# Patient Record
Sex: FEMALE | Race: WHITE | NOT HISPANIC OR LATINO | URBAN - METROPOLITAN AREA
[De-identification: names, ages, dates, MRNs, and addresses within clinical notes are randomized per-mention and may not be internally consistent; named-entity substitution may affect disease eponyms.]

---

## 2019-04-16 ENCOUNTER — IMPORTED ENCOUNTER (OUTPATIENT)
Dept: URBAN - METROPOLITAN AREA CLINIC 1 | Facility: CLINIC | Age: 84
End: 2019-04-16

## 2019-04-16 PROBLEM — H17.822: Noted: 2019-04-16

## 2019-04-16 PROBLEM — Z79.84: Noted: 2019-04-16

## 2019-04-16 PROBLEM — Z96.1: Noted: 2019-04-16

## 2019-04-16 PROBLEM — H16.143: Noted: 2019-04-16

## 2019-04-16 PROBLEM — H40.013: Noted: 2019-04-16

## 2019-04-16 PROBLEM — H35.372: Noted: 2019-04-16

## 2019-04-16 PROBLEM — H04.123: Noted: 2019-04-16

## 2019-04-16 PROBLEM — E11.9: Noted: 2019-04-16

## 2019-04-16 PROCEDURE — 92004 COMPRE OPH EXAM NEW PT 1/>: CPT

## 2019-04-16 NOTE — PATIENT DISCUSSION
1.  DM Type II (Oral Medication) without sign of diabetic retinopathy and no blot heme on dilated retinal examination today OU No Macular Edema:  Discussed the pathophysiology of diabetes and its effect on the eye and risk of blindness. Stressed the importance of strong glucose control. Advised of importance of at least yearly dilated examinations but to contact us immediately for any problems or concerns. 2. SIDDHARTHA w/ PEK OU- Recommend ATs TID OU routinely 3. Pseudophakia OU - H/o YAG Cap OU 4. Glaucoma Suspect OU (CD 0.80 OU): Negative family hx. Patient is considered Low Risk. Condition was discussed with patient and patient understands. Will continue to monitor patient for any progression in condition. Patient was advised to call us with any problems questions or concerns. 5.  Epiretinal Membrane OS - Observe for change. 6. Peripheral Corneal Scar OS - Observe 7. Temporal Arteritis - Followed by Dr. Judy Hamilton currently on 40mg of Prednisone. F/u as scheduledReturn for an appointment in 6 months DFE/OCT with Dr. Jose Alberto Tafoya.

## 2019-06-11 ENCOUNTER — IMPORTED ENCOUNTER (OUTPATIENT)
Dept: URBAN - METROPOLITAN AREA CLINIC 1 | Facility: CLINIC | Age: 84
End: 2019-06-11

## 2019-06-11 PROBLEM — Z79.84: Noted: 2019-06-11

## 2019-06-11 PROBLEM — H35.372: Noted: 2019-06-11

## 2019-06-11 PROBLEM — E11.9: Noted: 2019-06-11

## 2019-06-11 PROBLEM — H04.123: Noted: 2019-06-11

## 2019-06-11 PROBLEM — M31.6: Noted: 2019-06-11

## 2019-06-11 PROBLEM — H16.143: Noted: 2019-06-11

## 2019-06-11 PROBLEM — Z96.1: Noted: 2019-06-11

## 2019-06-11 PROCEDURE — 99213 OFFICE O/P EST LOW 20 MIN: CPT

## 2019-06-11 NOTE — PATIENT DISCUSSION
1.  Temporal Arteritis - Slit Lamp exam does not show a measurable difference  with respect to the optic nerve appearance. However her vision has declined by two lines in both eyes. 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 Also to be considered in a differential however is the patient has Dry Eye Syndrome which may also be affecting her visual acuity to this level of decline. I do feel that it is reasonable to increase her Prednisone dose at least initially to see if her symptoms improve. We will also increase her Artificial Tear use to Q3H OU.2.  SIDDHARTHA w/ PEK OU- Increase ATs Q3H OU 3. Pseudophakia OU - H/o YAG Cap OU 4.  Epiretinal Membrane OS - Observe for change. 5. DM Type II (Oral Medication) without sign of diabetic retinopathy and no blot heme on dilated retinal examination today OU No Macular Edema:  Discussed the pathophysiology of diabetes and its effect on the eye and risk of blindness. Stressed the importance of strong glucose control. Advised of importance of at least yearly dilated examinations but to contact us immediately for any problems or concerns. 6. Glaucoma Suspect OU (CD 0.80 OU): Negative family hx. Patient is considered Low Risk. 7.  Peripheral Corneal Scar OS - Observe Return for an appointment in 1 month 10/MRX (check ks) with Dr. Wes Conley.

## 2019-06-11 NOTE — PATIENT DISCUSSION
DM Type II (Oral Medication) without sign of diabetic retinopathy and no blot heme on dilated retinal examination today OU No Macular Edema:  Discussed the pathophysiology of diabetes and its effect on the eye and risk of blindness. Stressed the importance of strong glucose control. Advised of importance of at least yearly dilated examinations but to contact us immediately for any problems or concerns.

## 2019-07-15 ENCOUNTER — IMPORTED ENCOUNTER (OUTPATIENT)
Dept: URBAN - METROPOLITAN AREA CLINIC 1 | Facility: CLINIC | Age: 84
End: 2019-07-15

## 2019-07-15 PROBLEM — H04.123: Noted: 2019-07-15

## 2019-07-15 PROBLEM — H16.143: Noted: 2019-07-15

## 2019-07-15 PROCEDURE — 92012 INTRM OPH EXAM EST PATIENT: CPT

## 2019-07-15 NOTE — PATIENT DISCUSSION
1.  Temporal Arteritis - VA stable from last visit pt currently weaned down to 30mg Pred PO Qday. 2.  SIDDHARTHA w/ PEK OU -- Improved. Recommend continue the frequent use of OTC Q3H OU Routinely. 3.  Pseudophakia OU - H/o YAG Cap OU 4. H/ Epiretinal Membrane OS - Observe for change. 5. H/o DM Type II (Oral Medication) w/o DR / DME OU 6. Glaucoma Suspect OU (CD 0.80 OU): Negative family hx. Patient is considered Low Risk. 7.  Peripheral Corneal Scar OS -- ObserveReturn for an appointment in 10 WK for a 10 / DFE / 24-2 HVF OU with Dr. Rudy Humphrey.

## 2019-08-29 ENCOUNTER — IMPORTED ENCOUNTER (OUTPATIENT)
Dept: URBAN - METROPOLITAN AREA CLINIC 1 | Facility: CLINIC | Age: 84
End: 2019-08-29

## 2019-08-29 PROBLEM — H40.1131: Noted: 2019-08-29

## 2019-08-29 PROCEDURE — 92012 INTRM OPH EXAM EST PATIENT: CPT

## 2019-08-29 PROCEDURE — 92083 EXTENDED VISUAL FIELD XM: CPT

## 2019-08-29 PROCEDURE — 92015 DETERMINE REFRACTIVE STATE: CPT

## 2019-08-29 NOTE — PATIENT DISCUSSION
1.  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 Based on Fm Hx (Father) and VF defects OU noted on today's VF testing will begin monotrial of Travatan Z QHS OS (Sample given). Will recheck patient in 3-4 weeks recheck IOP on monotrial OS. 2.  Temporal Arteritis - VA stable from last visit pt currently weaned down to 20mg of po Prednisone Qdaily. 3.  SIDDHARTHA w/ PEK OU -- Cont PF ATs Q3hrs OU w/a Routinely. 4.  Pseudophakia OU - H/o YAG Cap OU 5. Epiretinal Membrane OS - Stable Observe for change. 6. DM Type II (Oral Medication) w/o DR / DME OU - Stable Glucose control. 7.  Peripheral Corneal Scar OS -- ObserveReturn for an appointment in 3-4 weeks 10 (monotrial IOP recheck OS) with Dr. Karl Lima.

## 2019-09-23 ENCOUNTER — IMPORTED ENCOUNTER (OUTPATIENT)
Dept: URBAN - METROPOLITAN AREA CLINIC 1 | Facility: CLINIC | Age: 84
End: 2019-09-23

## 2019-09-23 PROBLEM — H40.1131: Noted: 2019-09-23

## 2019-09-23 PROCEDURE — 92012 INTRM OPH EXAM EST PATIENT: CPT

## 2019-09-23 NOTE — PATIENT DISCUSSION
1.  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 (Due to  use will rx Lumigan). Will now use Lumigan and use QHS OU (Rx sent to [facility]). Compliance urged. 2.  Temporal Arteritis - VA stable from last visit pt monitored by Dr. Sherrill Paz regarding po Prednisone dosage & systemic testing. F/u as scheduled with Dr. Sherrill Paz 3. SIDDHARTHA w/ PEK OU -- Cont PF ATs Q3hrs OU w/a Routinely. 4.  Pseudophakia OU - H/o YAG Cap OU 5. Epiretinal Membrane OS - Stable Observe for change. 6. DM Type II (Oral Medication) w/o DR / DME OU - Stable Glucose control. 7.  Peripheral Corneal Scar OS -- ObserveReturn for an appointment in 6 mo 30 OCT with Dr. Cathleen Warner.

## 2019-10-24 ENCOUNTER — IMPORTED ENCOUNTER (OUTPATIENT)
Dept: URBAN - METROPOLITAN AREA CLINIC 1 | Facility: CLINIC | Age: 84
End: 2019-10-24

## 2019-10-24 PROBLEM — Z79.84: Noted: 2019-10-24

## 2019-10-24 PROBLEM — Z96.1: Noted: 2019-10-24

## 2019-10-24 PROBLEM — H35.3121: Noted: 2019-10-24

## 2019-10-24 PROBLEM — H04.123: Noted: 2019-10-24

## 2019-10-24 PROBLEM — H35.372: Noted: 2019-10-24

## 2019-10-24 PROBLEM — H16.143: Noted: 2019-10-24

## 2019-10-24 PROBLEM — E11.9: Noted: 2019-10-24

## 2019-10-24 PROBLEM — H17.822: Noted: 2019-10-24

## 2019-10-24 PROCEDURE — 92133 CPTRZD OPH DX IMG PST SGM ON: CPT

## 2019-10-24 PROCEDURE — 92012 INTRM OPH EXAM EST PATIENT: CPT

## 2019-10-24 NOTE — PATIENT DISCUSSION
1.  ARMD OS Early/dry/stable. Importance of daily AREDS II study multivitamin and Amsler Grid checks discussed with patient. Patient to follow-up immediately with any new onset of decreased vision and/or metamorphopsia. 2. Epiretinal Membrane OS - Observe for change. 3. 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 IOP stable cont Lumigan QHS OU. Fm Hx (Father)DM Type II (Oral Medication) without sign of diabetic retinopathy and no blot heme on dilated retinal examination today OU No Macular Edema:  Discussed the pathophysiology of diabetes and its effect on the eye and risk of blindness. Stressed the importance of strong glucose control. Advised of importance of at least yearly dilated examinations but to contact us immediately for any problems or concerns. 4. SIDDHARTHA w/ PEK OU- Recommend ATs TID OU routinely 5. Pseudophakia OU - h/o YAG Cap OU 6. Peripheral Corneal Scar OS - Stable 7. Temporal Arteritis - VA stable from last visit pt monitored by Dr. Collin Wilson regarding po Prednisone dosage & systemic testing.  F/u as scheduled with Dr. Collin Wilson

## 2020-02-14 ENCOUNTER — IMPORTED ENCOUNTER (OUTPATIENT)
Dept: URBAN - METROPOLITAN AREA CLINIC 1 | Facility: CLINIC | Age: 85
End: 2020-02-14

## 2020-02-14 PROBLEM — M31.6: Noted: 2020-02-14

## 2020-02-14 PROCEDURE — 92012 INTRM OPH EXAM EST PATIENT: CPT

## 2020-02-14 NOTE — PATIENT DISCUSSION
1.  Temporal Arteritis - Va Stable from previous visit. Patient is experiencing constant headaches advised to follow up with Dr. Felton Lorenz for [address]. SIDDHARTHA w/ PEK OU- Recommend ATs TID OU routinely 3. Pseudophakia OU - h/o YAG Cap OU 4. 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 Fm Hx (Father)5. Peripheral Corneal Scar OS - Stable 6. H/o DM w/o DR OU 7. H/o ARMD OS 8. H/o ERM OSReturn for an appointment for Return as scheduled with Dr. Kaitlin Champion.

## 2020-02-14 NOTE — PATIENT DISCUSSION
SIDDHARTHA w/ PEK OU- Recommend ATs TID OU routinely 3. Pseudophakia OU - h/o YAG Cap OU 4. 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 IOP stable cont Lumigan QHS OU. Fm Hx (Father)5. Peripheral Corneal Scar OS - Stable 6. H/o DM w/o DR OU 7. H/o ARMD OS 8.   H/o ERM OS

## 2020-05-28 ENCOUNTER — IMPORTED ENCOUNTER (OUTPATIENT)
Dept: URBAN - METROPOLITAN AREA CLINIC 1 | Facility: CLINIC | Age: 85
End: 2020-05-28

## 2020-05-28 PROBLEM — Z79.4: Noted: 2020-05-28

## 2020-05-28 PROBLEM — H17.822: Noted: 2020-05-28

## 2020-05-28 PROBLEM — H16.143: Noted: 2020-05-28

## 2020-05-28 PROBLEM — H35.3121: Noted: 2020-05-28

## 2020-05-28 PROBLEM — E11.9: Noted: 2020-05-28

## 2020-05-28 PROBLEM — H40.1131: Noted: 2020-05-28

## 2020-05-28 PROBLEM — H04.123: Noted: 2020-05-28

## 2020-05-28 PROBLEM — H35.372: Noted: 2020-05-28

## 2020-05-28 PROBLEM — Z96.1: Noted: 2020-05-28

## 2020-05-28 PROCEDURE — 92015 DETERMINE REFRACTIVE STATE: CPT

## 2020-05-28 PROCEDURE — 92014 COMPRE OPH EXAM EST PT 1/>: CPT

## 2020-05-28 NOTE — PATIENT DISCUSSION
1.  DM Type II (Insulin) without sign of diabetic retinopathy and no blot heme on dilated retinal examination today OU No Macular Edema:  Discussed the pathophysiology of diabetes and its effect on the eye and risk of blindness. Stressed the importance of strong glucose control. Advised of importance of at least yearly dilated examinations but to contact us immediately for any problems or concerns. 2. ARMD OS Early/dry/stable. Importance of daily AREDS II study multivitamin and Amsler Grid checks discussed with patient. Patient to follow-up immediately with any new onset of decreased vision and/or metamorphopsia. 3.  Temporal Arteritis - Va Stable from previous visit. Currently on Prednisone 5mg PO QDaily. Patient is experiencing headaches daily advised to follow up with Dr. Oli Lockhart for  [address]. 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 Patient advised to be compliant with gtts. Condition was discussed with patient and patient understands. Will continue to monitor patient for any progression in condition. Patient was advised to call us with any problems questions or concerns. 5.  SIDDHARTHA w/ PEK OU- Recommend ATs TID OU routinely 6. Pseudophakia OU - H/o YAG Cap OU 7. Epiretinal Membrane OS - Observe for change. 8. Peripheral Corneal Scar OSReturn for an appointment in November DFE/OCT with Dr. Anil Ortiz.

## 2020-11-13 ENCOUNTER — IMPORTED ENCOUNTER (OUTPATIENT)
Dept: URBAN - METROPOLITAN AREA CLINIC 1 | Facility: CLINIC | Age: 85
End: 2020-11-13

## 2020-11-13 PROBLEM — E11.3292: Noted: 2020-11-13

## 2020-11-13 PROBLEM — H40.1131: Noted: 2020-11-13

## 2020-11-13 PROBLEM — Z79.4: Noted: 2020-11-13

## 2020-11-13 PROBLEM — H00.12: Noted: 2020-11-13

## 2020-11-13 PROBLEM — H35.3121: Noted: 2020-11-13

## 2020-11-13 PROCEDURE — 92012 INTRM OPH EXAM EST PATIENT: CPT

## 2020-11-13 PROCEDURE — 92133 CPTRZD OPH DX IMG PST SGM ON: CPT

## 2020-11-13 NOTE — PATIENT DISCUSSION
1.  ARMD OS Early/dry/stable. Importance of daily AREDS II study multivitamin and Amsler Grid checks discussed with patient. Patient to follow-up immediately with any new onset of decreased vision and/or metamorphopsia. 2. 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 IOP stable continue Lumigan QHS OU. Patient advised to be compliant with gtts. Condition was discussed with patient and patient understands. Will continue to monitor patient for any progression in condition. Patient was advised to call us with any problems questions or concerns. 3.  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 Discussed the pathophysiology of diabetes and its effect on the eye and risk of blindness. Stressed the importance of strong glucose control. Advised of importance of at least yearly dilated examinations but to contact us immediately for any problems or concerns. 4.  DM Type II (Insulin) without sign of diabetic retinopathy and no blot heme on dilated retinal examination today OU No Macular Edema:  Discussed the pathophysiology of diabetes and its effect on the eye and risk of blindness. Stressed the importance of strong glucose control. Advised of importance of at least yearly dilated examinations but to contact us immediately for any problems or concerns. 2. Chalazion right lower eyelid - Begin Doxycycline 50mg po x 1 month disp#30 (erx). Hot compresses TID x 5 minutes for 3 weeks. If without improvement discussed with patient possible Incision and Drainage procedure. Risks and benefits discussed with patient and patient states full understanding. 3. Temporal Arteritis - Va Stable from previous visit. Currently on Prednisone 5mg PO QDaily. Patient advised to follow up [facility]. SIDDHARTHA w/ PEK OU- Recommend ATs TID OU routinely 6. Pseudophakia OU - H/o YAG Cap OU 7. Epiretinal Membrane OS - Observe for change. 8. Peripheral Corneal Scar OSReturn for an appointment in 4-6 weeks 10/DFE/MAC OCT OS with Dr. Jessica De Dios.

## 2020-11-13 NOTE — PATIENT DISCUSSION
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 Discussed the pathophysiology of diabetes and its effect on the eye and risk of blindness. Stressed the importance of strong glucose control. Advised of importance of at least yearly dilated examinations but to contact us immediately for any problems or concerns.

## 2020-11-13 NOTE — PATIENT DISCUSSION
DM Type II (Insulin) without sign of diabetic retinopathy and no blot heme on dilated retinal examination today OU No Macular Edema:  Discussed the pathophysiology of diabetes and its effect on the eye and risk of blindness. Stressed the importance of strong glucose control. Advised of importance of at least yearly dilated examinations but to contact us immediately for any problems or concerns. 2. Chalazion right lower eyelid - Begin Doxycycline 50mg po x 1 month disp#30 (erx). Hot compresses TID x 5 minutes for 3 weeks. If without improvement discussed with patient possible Incision and Drainage procedure. Risks and benefits discussed with patient and patient states full understanding. 3. Temporal Arteritis - Va Stable from previous visit. Currently on Prednisone 5mg PO QDaily. Patient is experiencing headaches daily advised to follow up with Dr. Leti Gomez for Jaqueline Beck[place] 8. SIDDHARTHA w/ PEK OU- Recommend ATs TID OU routinely 6. Pseudophakia OU - H/o YAG Cap OU 7. Epiretinal Membrane OS - Observe for change. 8.   Peripheral Corneal Scar OS

## 2020-12-31 ENCOUNTER — IMPORTED ENCOUNTER (OUTPATIENT)
Dept: URBAN - METROPOLITAN AREA CLINIC 1 | Facility: CLINIC | Age: 85
End: 2020-12-31

## 2020-12-31 PROBLEM — H00.11: Noted: 2020-12-31

## 2020-12-31 PROBLEM — Z96.1: Noted: 2020-12-31

## 2020-12-31 PROBLEM — H04.123: Noted: 2020-12-31

## 2020-12-31 PROBLEM — H35.3122: Noted: 2020-12-31

## 2020-12-31 PROBLEM — H35.372: Noted: 2020-12-31

## 2020-12-31 PROBLEM — H16.143: Noted: 2020-12-31

## 2020-12-31 PROCEDURE — 92134 CPTRZ OPH DX IMG PST SGM RTA: CPT

## 2020-12-31 PROCEDURE — 92012 INTRM OPH EXAM EST PATIENT: CPT

## 2020-12-31 NOTE — PATIENT DISCUSSION
1.  ARMD OS intermediate/dry/stable. Will refer to retina for evaluation second to vision change. Importance of daily AREDS II study multivitamin and Amsler Grid checks discussed with patient. Patient to follow-up immediately with any new onset of decreased vision and/or metamorphopsia. 2. Epiretinal Membrane OS - Observe for change. 3. Chalazion RUL--Patient was compliant with hot compresses without resolution. Will schedule I&D next week. 4.  SIDDHARTHA w/ PEK OU-The continuation of artificial tears were recommended. 5.  Pseudophakia OU - 6. 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 IOP stable continue Lumigan QHS OU. Patient advised to be compliant with gtts. Condition was discussed with patient and patient understands. Will continue to monitor patient for any progression in condition. Patient was advised to call us with any problems questions or concerns. 7.  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 Discussed the pathophysiology of diabetes and its effect on the eye and risk of blindness. Stressed the importance of strong glucose control. Advised of importance of at least yearly dilated examinations but to contact us immediately for any problems or concerns. 8. Temporal Arteritis - Va Stable from previous visit. Currently on Prednisone 5mg PO QDaily. Patient advised to follow up with Dr. Leti Gomez for PO Predniosone maintenance. Followed by Dr. Dick Garcia. 9. Peripheral Corneal Scar OS10. ?? 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 Prior ocular motility sx stable x 8 years. Followed by Dr. Dick Garcia. 11. Return for an appointment for I&D Shante RLL with Dr. Carley Bass. 12.  Return for an appointment for Refer to Guzman/ Debra for ARMD eval OS.

## 2020-12-31 NOTE — PATIENT DISCUSSION
Shante NIXON--Patient was compliant with hot compresses without resolution. Will schedule I&D next week.

## 2021-01-14 ENCOUNTER — IMPORTED ENCOUNTER (OUTPATIENT)
Dept: URBAN - METROPOLITAN AREA CLINIC 1 | Facility: CLINIC | Age: 86
End: 2021-01-14

## 2021-01-14 PROCEDURE — 67800 REMOVE EYELID LESION: CPT

## 2021-01-14 NOTE — PATIENT DISCUSSION
Incision and drainage of chalazion on right lower eyelid: After proper informed consent was obtained the patient was taken to the operating room and placed supine on the operating table. The right lower eye lid was prepped in the standard surgical fashion. Xylocaine 1% and Epinephrine was infiltrated around the chalazion. A chalazion speculum was then placed and the eyelid was everted. A cross incision was made through the chalazion and immediate release of the lipogranulomatous material was expressed. A curette was used to further evacuate the chalazion cavity. The speculum was removed ointment was applied and a pressure patch was then placed. The patient tolerated the procedure well and there were no complications. Return for an appointment in 4 months for a 10 with Dr. Ayde Maradiaga.

## 2021-01-21 PROBLEM — H00.12: Noted: 2021-01-21

## 2021-02-18 ENCOUNTER — IMPORTED ENCOUNTER (OUTPATIENT)
Dept: URBAN - METROPOLITAN AREA CLINIC 1 | Facility: CLINIC | Age: 86
End: 2021-02-18

## 2021-02-18 PROBLEM — H00.12: Noted: 2021-02-18

## 2021-02-18 PROCEDURE — 92012 INTRM OPH EXAM EST PATIENT: CPT

## 2021-02-18 NOTE — PATIENT DISCUSSION
1.  Chalazion right lower eyelid - Hot compresses TID x 5 minutes for 3 weeks ou lid scrubs with baby shampoo ou bid and Doxycycyline 50mg PO BID (Rx sent to pharm). If without improvement discussed with patient possible Incision and Drainage procedure. Risks and benefits discussed with patient and patient states full understanding. 2. ARMD OS intermediate/dry/stable. Will refer to retina for evaluation second to vision change. Importance of daily AREDS II study multivitamin and Amsler Grid checks discussed with patient. Patient to follow-up immediately with any new onset of decreased vision and/or metamorphopsia. 3. Epiretinal Membrane OS - Observe for change. 4. SIDDHARTHA w/ PEK OU-The continuation of artificial tears were recommended. 5.  Pseudophakia OU - 6. 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 IOP stable continue Lumigan QHS OU. Patient advised to be compliant with gtts. Condition was discussed with patient and patient understands. Will continue to monitor patient for any progression in condition. Patient was advised to call us with any problems questions or concerns. 7.  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 Discussed the pathophysiology of diabetes and its effect on the eye and risk of blindness. Stressed the importance of strong glucose control. Advised of importance of at least yearly dilated examinations but to contact us immediately for any problems or concerns. 8. Temporal Arteritis - Va Stable from previous visit. Currently on Prednisone 5mg PO QDaily. Patient advised to follow up with Dr. Katharina Serrano for PO Predniosone maintenance. Followed by Dr. Amber Kaminski. 9. Peripheral Corneal Scar OS10. ?? 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 Prior ocular motility sx stable x 8 years. Followed by Dr. Amber Kaminski. 11. Return for an appointment for 2-3 weeks for a 10 with Dr. Dara Velázquez.

## 2021-03-05 ENCOUNTER — IMPORTED ENCOUNTER (OUTPATIENT)
Dept: URBAN - METROPOLITAN AREA CLINIC 1 | Facility: CLINIC | Age: 86
End: 2021-03-05

## 2021-03-05 PROBLEM — H00.12: Noted: 2021-03-05

## 2021-03-05 PROCEDURE — 99213 OFFICE O/P EST LOW 20 MIN: CPT

## 2021-03-05 NOTE — PATIENT DISCUSSION
1.  Chalazion RLL- Continue Doxycycline PO BID until gone. Patient was compliant with hot compresses without resolution. Discussed option for Incision and Drainage. Risks and Benefits discussed with patient. Patient stated complete understanding and would like to proceed with procedure. 2. SIDDHARTHA w/ PEK OU-The continuation of artificial tears were recommended. 3.  Pseudophakia OU - Doing Well 4. 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 Patient advised to be compliant with gtts. 5. Peripheral Corneal Scar OS6. H/o DM Type II (Insulin) with mild Nonproliferative Diabetic Retinopathy OS No Macular Edema7. H/o ARMD OS 8. H/o Epiretinal Membrane OS9. H/o Temporal Arteritis - Va Stable from previous visit. Currently on Prednisone 5mg PO QDaily. Patient advised to follow up with Dr. Garcia Reyna for PO Predniosone maintenance. Followed by Dr. Sharath Wright. 10. 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 Prior ocular motility sx stable x 8 years. Followed by Dr. Sharath Wright. Return for an appointment in Monday I&D RLL with Dr. Ravindra Larsen.

## 2021-03-05 NOTE — PATIENT DISCUSSION
SIDDHARTHA w/ PEK OU-The continuation of artificial tears were recommended. 3.  Pseudophakia OU - Doing Well 4. 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 Patient advised to be compliant with gtts. 5. Peripheral Corneal Scar OS6. H/o DM Type II (Insulin) with mild Nonproliferative Diabetic Retinopathy OS No Macular Edema7. H/o ARMD OS 8. H/o Epiretinal Membrane OS9. H/o Temporal Arteritis - Va Stable from previous visit. Currently on Prednisone 5mg PO QDaily. Patient advised to follow up with Dr. Adalberto Hardin for PO Predniosone maintenance. Followed by Dr. Jaime Ge. 10. 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 Prior ocular motility sx stable x 8 years. Followed by Dr. Jaime Ge. Return for an appointment in Monday I&D RLL with Dr. Theresa Ruiz.

## 2021-03-08 ENCOUNTER — IMPORTED ENCOUNTER (OUTPATIENT)
Dept: URBAN - METROPOLITAN AREA CLINIC 1 | Facility: CLINIC | Age: 86
End: 2021-03-08

## 2021-03-08 PROCEDURE — 67800 REMOVE EYELID LESION: CPT

## 2021-03-08 NOTE — PATIENT DISCUSSION
Incision and drainage of chalazion on right upper eyelid:  After proper informed consent was obtained the patient was taken to the operating room and placed supine on the operating table. The right upper eye lid was prepped in the standard surgical fashion. Xylocaine 1% and Epinephrine was infiltrated around the chalazion. A chalazion speculum was then placed and the eyelid was everted. A cross incision was made through the chalazion and immediate release of the lipogranulomatous material was expressed. A curette was used to further evacuate the chalazion cavity. The speculum was removed ointment was applied and a pressure patch was placed. The patient tolerated the procedure well and there were no complications. The patient was instructed to use Maxitrol maite qhs OD.

## 2021-03-08 NOTE — PATIENT DISCUSSION
Incision and drainage of chalazion on right lower eyelid: After proper informed consent was obtained the patient was taken to the operating room and placed supine on the operating table. The right lower eye lid was prepped in the standard surgical fashion. Xylocaine 1% and Epinephrine was infiltrated around the chalazion. A chalazion speculum was then placed and the eyelid was everted. A cross incision was made through the chalazion and immediate release of the lipogranulomatous material was expressed. A curette was used to further evacuate the chalazion cavity. The speculum was removed ointment was applied and a pressure patch was then placed. The patient tolerated the procedure well and there were no complications. The patient was instructed to use Maxitrol ointment qhs OD.

## 2021-03-15 PROBLEM — H00.12: Noted: 2021-03-15

## 2021-03-15 PROBLEM — H00.11: Noted: 2021-03-15

## 2021-05-28 ENCOUNTER — IMPORTED ENCOUNTER (OUTPATIENT)
Dept: URBAN - METROPOLITAN AREA CLINIC 1 | Facility: CLINIC | Age: 86
End: 2021-05-28

## 2021-05-28 PROBLEM — H40.1131: Noted: 2021-05-28

## 2021-05-28 PROBLEM — H00.12: Noted: 2021-05-28

## 2021-05-28 PROCEDURE — 99213 OFFICE O/P EST LOW 20 MIN: CPT

## 2021-05-28 NOTE — PATIENT DISCUSSION
1.  Mild Open Angle Glaucoma OU -- (CD 0.80 OU) IOP stable continue Lumigan QHS OU. Patient advised to be compliant with gtts. 2. Chalazion RLL -- Pt non compliant w/ Hot compresses s/p incision and drainage procedure. Compliance with Hot compresses TID x 5 minutes until completely resolved. 3.  Blepahritis OU -- Cont Lid Hygeine - hot compresses as directed and lid scrubs qdaily. 4.  SIDDHARTHA w/ PEK OU -- Continue ATs TID OU routinely. 5. Pseudophakia OU - Doing Well 6. Peripheral Corneal Scar OS7. H/o DM Type II (Insulin) with mild Nonproliferative Diabetic Retinopathy OS No Macular Edema8. H/o ARMD OS 9. H/o Epiretinal Membrane OS10. H/o Temporal Arteritis - Va Stable from previous visit. Currently on Prednisone 5mg PO QDaily. Patient advised to follow up with Dr. Destiney Ritter for PO Predniosone maintenance. Followed by Dr. Steve Lewis. 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 Prior ocular motility sx stable x 8 years. Followed by Dr. Steve Lewis. Return for an appointment October for a 30/OCT with Dr. Troy Soliman.

## 2021-10-28 ENCOUNTER — IMPORTED ENCOUNTER (OUTPATIENT)
Dept: URBAN - METROPOLITAN AREA CLINIC 1 | Facility: CLINIC | Age: 86
End: 2021-10-28

## 2021-10-28 PROBLEM — H40.1131: Noted: 2021-10-28

## 2021-10-28 PROBLEM — H35.3131: Noted: 2021-10-28

## 2021-10-28 PROBLEM — H35.3121: Noted: 2021-10-28

## 2021-10-28 PROBLEM — E11.3292: Noted: 2021-10-28

## 2021-10-28 PROBLEM — H35.372: Noted: 2021-10-28

## 2021-10-28 PROBLEM — Z79.84: Noted: 2021-10-28

## 2021-10-28 PROCEDURE — 92015 DETERMINE REFRACTIVE STATE: CPT

## 2021-10-28 PROCEDURE — 99214 OFFICE O/P EST MOD 30 MIN: CPT

## 2021-10-28 PROCEDURE — 92133 CPTRZD OPH DX IMG PST SGM ON: CPT

## 2021-10-28 NOTE — PATIENT DISCUSSION
1.  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 Discussed the pathophysiology of diabetes and its effect on the eye and risk of blindness. Stressed the importance of strong glucose control. Advised of importance of at least yearly dilated examinations but to contact us immediately for any problems or concerns. 2. DM Type II (Insulin) without sign of diabetic retinopathy and no blot heme on dilated retinal examination today OD No Macular Edema:  Discussed the pathophysiology of diabetes and its effect on the eye and risk of blindness. Stressed the importance of strong glucose control. Advised of importance of at least yearly dilated examinations but to contact us immediately for any problems or concerns. 3. ARMD OSearly/dry/stable. Followed by Roman Manrique. Importance of daily AREDS II study multivitamin and Amsler Grid checks discussed with patient. Patient to follow-up immediately with any new onset of decreased vision and/or metamorphopsia. 3. Epiretinal Membrane OS - Stable. Followed by Roman Manrique. Observe for change. 4. 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 OCT today no progression OU. Patient advised to be compliant with gtts. Condition was discussed with patient and patient understands. Will continue to monitor patient for any progression in condition. Patient was advised to call us with any problems questions or concerns. 5.  Temporal Arteritis - Va Stable from previous visit. Currently on Prednisone 5mg PO QDaily. 6.  SIDDHARTHA w/ PEK OU- Recommend ATs TID OU routinely 7. Pseudophakia OU - H/o YAG Cap OU  8. Peripheral Corneal Scar OSReturn for an appointment in 6 months for a 10 *no dfe per PMG pt is followed by retina* with Dr. Rudy Humphrey.

## 2021-10-28 NOTE — PATIENT DISCUSSION
ARMD OSearly/dry/stable. Importance of daily AREDS II study multivitamin and Amsler Grid checks discussed with patient. Patient to follow-up immediately with any new onset of decreased vision and/or metamorphopsia.

## 2021-11-26 NOTE — PATIENT DISCUSSION
Epiretinal Membrane OS - Observe for change. [FreeTextEntry1] : For ingrown toenail- warm soaks and mupirocin TID. Return for worsening swelling, redness, pain.\par \par For rash on abdomen- trial of hydrocortisone TID x 2 weeks. Return if spreading or not improved.

## 2022-04-02 ASSESSMENT — TONOMETRY
OS_IOP_MMHG: 12
OD_IOP_MMHG: 19
OD_IOP_MMHG: 14
OD_IOP_MMHG: 16
OS_IOP_MMHG: 13
OD_IOP_MMHG: 16
OS_IOP_MMHG: 12
OS_IOP_MMHG: 13
OD_IOP_MMHG: 11
OD_IOP_MMHG: 17
OS_IOP_MMHG: 14
OD_IOP_MMHG: 14
OD_IOP_MMHG: 12
OS_IOP_MMHG: 16
OD_IOP_MMHG: 13
OS_IOP_MMHG: 16
OD_IOP_MMHG: 15
OS_IOP_MMHG: 17
OS_IOP_MMHG: 13
OS_IOP_MMHG: 13
OS_IOP_MMHG: 19
OD_IOP_MMHG: 12
OD_IOP_MMHG: 14
OS_IOP_MMHG: 14
OS_IOP_MMHG: 18
OS_IOP_MMHG: 16
OD_IOP_MMHG: 11
OD_IOP_MMHG: 18

## 2022-04-02 ASSESSMENT — VISUAL ACUITY
OS_CC: 20/40
OS_CC: J5
OS_SC: 20/50
OS_SC: 20/40
OD_SC: 20/30
OS_SC: 20/40
OS_SC: 20/60
OD_CC: 20/20
OD_SC: 20/40
OS_CC: 20/100
OD_SC: J2
OD_SC: 20/30
OD_SC: 20/30
OS_SC: J10
OD_CC: 20/80
OS_SC: 20/40
OD_CC: J2
OD_SC: 20/40
OD_CC: 20/25
OS_CC: 20/400
OS_CC: 20/400
OD_CC: J10
OS_SC: 20/40
OD_SC: 20/25
OD_SC: 20/30+2
OS_SC: 20/60
OS_SC: 20/50
OS_CC: 20/80
OS_SC: 20/40-2
OD_CC: 20/25-2
OS_CC: 20/100-2
OD_CC: 20/20
OS_SC: 20/100
OD_SC: 20/50
OD_SC: 20/30+2
OD_SC: 20/40

## 2022-04-08 ENCOUNTER — EMERGENCY VISIT (OUTPATIENT)
Dept: URBAN - METROPOLITAN AREA CLINIC 1 | Facility: CLINIC | Age: 87
End: 2022-04-08

## 2022-04-08 DIAGNOSIS — H35.372: ICD-10-CM

## 2022-04-08 DIAGNOSIS — H35.61: ICD-10-CM

## 2022-04-08 DIAGNOSIS — E11.3293: ICD-10-CM

## 2022-04-08 DIAGNOSIS — H35.3121: ICD-10-CM

## 2022-04-08 DIAGNOSIS — H40.1131: ICD-10-CM

## 2022-04-08 PROCEDURE — 92012 INTRM OPH EXAM EST PATIENT: CPT

## 2022-04-08 ASSESSMENT — VISUAL ACUITY: OS_SC: 20/80

## 2022-04-08 ASSESSMENT — TONOMETRY: OD_IOP_MMHG: 14

## 2022-04-08 NOTE — PATIENT DISCUSSION
1.  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 Discussed the pathophysiology of diabetes and its effect on the eye and risk of blindness. Stressed the importance of strong glucose control. Advised of importance of at least yearly dilated examinations but to contact us immediately for any problems or concerns. 2. DM Type II (Insulin) without sign of diabetic retinopathy and no blot heme on dilated retinal examination today OD No Macular Edema:  Discussed the pathophysiology of diabetes and its effect on the eye and risk of blindness. Stressed the importance of strong glucose control. Advised of importance of at least yearly dilated examinations but to contact us immediately for any problems or concerns. 3. ARMD OSearly/dry/stable. Followed by Danica Leyva. Importance of daily AREDS II study multivitamin and Amsler Grid checks discussed with patient. Patient to follow-up immediately with any new onset of decreased vision and/or metamorphopsia. 3. Epiretinal Membrane OS - Stable. Followed by Danica Leyva. Observe for change. 4. 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 OCT today no progression OU. Patient advised to be compliant with gtts. Condition was discussed with patient and patient understands. Will continue to monitor patient for any progression in condition. Patient was advised to call us with any problems questions or concerns. 5.  Temporal Arteritis - Va Stable from previous visit. Currently on Prednisone 5mg PO QDaily. 6.  SIDDHARTHA w/ PEK OU- Recommend ATs TID OU routinely 7. Pseudophakia OU - H/o YAG Cap OU  8. Peripheral Corneal Scar OSReturn for an appointment in 6 months for a 10 *no dfe per PMG pt is followed by retina* with Dr. Katiuska Gutierrez.

## 2022-07-12 ENCOUNTER — FOLLOW UP (OUTPATIENT)
Dept: URBAN - METROPOLITAN AREA CLINIC 1 | Facility: CLINIC | Age: 87
End: 2022-07-12

## 2022-07-12 DIAGNOSIS — H40.1131: ICD-10-CM

## 2022-07-12 DIAGNOSIS — M31.6: ICD-10-CM

## 2022-07-12 PROCEDURE — 99213 OFFICE O/P EST LOW 20 MIN: CPT

## 2022-07-12 ASSESSMENT — TONOMETRY
OD_IOP_MMHG: 13
OS_IOP_MMHG: 15

## 2022-07-12 ASSESSMENT — VISUAL ACUITY
OS_SC: 20/60
OD_SC: 20/40

## 2022-07-12 NOTE — PATIENT DISCUSSION
(CD 0.80 OU) IOP stable. Continue Lumigan QHS OU. Patient advised to be compliant with gtts. Condition was discussed with patient and patient understands. Will continue to monitor patient for any progression in condition. Patient was advised to call us with any problems questions or concerns.

## 2022-11-14 ENCOUNTER — FOLLOW UP (OUTPATIENT)
Dept: URBAN - METROPOLITAN AREA CLINIC 1 | Facility: CLINIC | Age: 87
End: 2022-11-14

## 2022-11-14 DIAGNOSIS — H35.3131: ICD-10-CM

## 2022-11-14 DIAGNOSIS — H40.1131: ICD-10-CM

## 2022-11-14 DIAGNOSIS — E11.3293: ICD-10-CM

## 2022-11-14 DIAGNOSIS — H35.372: ICD-10-CM

## 2022-11-14 PROCEDURE — 99213 OFFICE O/P EST LOW 20 MIN: CPT

## 2022-11-14 PROCEDURE — 92083 EXTENDED VISUAL FIELD XM: CPT

## 2022-11-14 ASSESSMENT — VISUAL ACUITY
OD_SC: 20/40
OS_PH: 20/50-2
OS_SC: 20/150

## 2022-11-14 ASSESSMENT — TONOMETRY
OD_IOP_MMHG: 16
OS_IOP_MMHG: 16

## 2022-11-14 NOTE — PATIENT DISCUSSION
(CD 0.80 OU) IOP stable. HVF today shows no progression OU. Continue Lumigan QHS OU. Patient advised to be compliant with gtts. Condition was discussed with patient and patient understands. Will continue to monitor patient for any progression in condition. Patient was advised to call us with any problems questions or concerns.

## 2023-05-26 ENCOUNTER — COMPREHENSIVE EXAM (OUTPATIENT)
Dept: URBAN - METROPOLITAN AREA CLINIC 1 | Facility: CLINIC | Age: 88
End: 2023-05-26

## 2023-05-26 DIAGNOSIS — Z96.1: ICD-10-CM

## 2023-05-26 DIAGNOSIS — E11.3293: ICD-10-CM

## 2023-05-26 DIAGNOSIS — H40.1131: ICD-10-CM

## 2023-05-26 DIAGNOSIS — H04.123: ICD-10-CM

## 2023-05-26 DIAGNOSIS — H35.3131: ICD-10-CM

## 2023-05-26 DIAGNOSIS — H35.372: ICD-10-CM

## 2023-05-26 DIAGNOSIS — H16.143: ICD-10-CM

## 2023-05-26 PROCEDURE — 99214 OFFICE O/P EST MOD 30 MIN: CPT

## 2023-05-26 PROCEDURE — 92015 DETERMINE REFRACTIVE STATE: CPT

## 2023-05-26 PROCEDURE — 92133 CPTRZD OPH DX IMG PST SGM ON: CPT

## 2023-05-26 ASSESSMENT — TONOMETRY
OS_IOP_MMHG: 15
OD_IOP_MMHG: 15

## 2023-05-26 ASSESSMENT — VISUAL ACUITY
OS_SC: 20/80-1
OD_SC: 20/30+2

## 2023-12-04 ENCOUNTER — FOLLOW UP (OUTPATIENT)
Dept: URBAN - METROPOLITAN AREA CLINIC 1 | Facility: CLINIC | Age: 88
End: 2023-12-04

## 2023-12-04 DIAGNOSIS — H34.231: ICD-10-CM

## 2023-12-04 DIAGNOSIS — H40.1131: ICD-10-CM

## 2023-12-04 DIAGNOSIS — E11.3293: ICD-10-CM

## 2023-12-04 DIAGNOSIS — H35.3131: ICD-10-CM

## 2023-12-04 PROCEDURE — 92083 EXTENDED VISUAL FIELD XM: CPT

## 2023-12-04 PROCEDURE — 99213 OFFICE O/P EST LOW 20 MIN: CPT

## 2023-12-04 ASSESSMENT — TONOMETRY
OS_IOP_MMHG: 15
OD_IOP_MMHG: 14

## 2023-12-04 ASSESSMENT — VISUAL ACUITY
OD_SC: 20/40
OS_SC: 20/100

## 2024-08-13 ENCOUNTER — EMERGENCY VISIT (OUTPATIENT)
Dept: URBAN - METROPOLITAN AREA CLINIC 1 | Facility: CLINIC | Age: 89
End: 2024-08-13

## 2024-08-13 DIAGNOSIS — H01.02A: ICD-10-CM

## 2024-08-13 DIAGNOSIS — H01.02B: ICD-10-CM

## 2024-08-13 DIAGNOSIS — H04.123: ICD-10-CM

## 2024-08-13 DIAGNOSIS — H16.143: ICD-10-CM

## 2024-08-13 PROCEDURE — 99213 OFFICE O/P EST LOW 20 MIN: CPT

## 2024-08-13 ASSESSMENT — VISUAL ACUITY
OU_OTHER: +2.50 OTC
OU_CC: 20/25
OD_SC: 20/30
OS_SC: 20/200

## 2024-08-13 ASSESSMENT — TONOMETRY
OS_IOP_MMHG: 13
OD_IOP_MMHG: 13

## 2024-11-19 ENCOUNTER — COMPREHENSIVE EXAM (OUTPATIENT)
Dept: URBAN - METROPOLITAN AREA CLINIC 1 | Facility: CLINIC | Age: 89
End: 2024-11-19

## 2024-11-19 DIAGNOSIS — H35.3131: ICD-10-CM

## 2024-11-19 DIAGNOSIS — E11.3293: ICD-10-CM

## 2024-11-19 DIAGNOSIS — H16.143: ICD-10-CM

## 2024-11-19 DIAGNOSIS — H01.02A: ICD-10-CM

## 2024-11-19 DIAGNOSIS — H04.123: ICD-10-CM

## 2024-11-19 DIAGNOSIS — H35.372: ICD-10-CM

## 2024-11-19 DIAGNOSIS — H34.231: ICD-10-CM

## 2024-11-19 DIAGNOSIS — Z96.1: ICD-10-CM

## 2024-11-19 DIAGNOSIS — H40.1131: ICD-10-CM

## 2024-11-19 DIAGNOSIS — H01.02B: ICD-10-CM

## 2024-11-19 PROCEDURE — 92015 DETERMINE REFRACTIVE STATE: CPT

## 2024-11-19 PROCEDURE — 92133 CPTRZD OPH DX IMG PST SGM ON: CPT

## 2024-11-19 PROCEDURE — 99214 OFFICE O/P EST MOD 30 MIN: CPT

## 2025-05-19 ENCOUNTER — FOLLOW UP (OUTPATIENT)
Age: OVER 89
End: 2025-05-19

## 2025-05-19 DIAGNOSIS — H04.123: ICD-10-CM

## 2025-05-19 DIAGNOSIS — H35.3131: ICD-10-CM

## 2025-05-19 DIAGNOSIS — H34.231: ICD-10-CM

## 2025-05-19 DIAGNOSIS — E11.3293: ICD-10-CM

## 2025-05-19 DIAGNOSIS — H40.1131: ICD-10-CM

## 2025-05-19 PROCEDURE — 99213 OFFICE O/P EST LOW 20 MIN: CPT

## 2025-05-19 PROCEDURE — 92083 EXTENDED VISUAL FIELD XM: CPT
